# Patient Record
Sex: FEMALE | Race: WHITE
[De-identification: names, ages, dates, MRNs, and addresses within clinical notes are randomized per-mention and may not be internally consistent; named-entity substitution may affect disease eponyms.]

---

## 2017-03-14 ENCOUNTER — HOSPITAL ENCOUNTER (OUTPATIENT)
Dept: HOSPITAL 53 - M RAD | Age: 82
End: 2017-03-14
Attending: INTERNAL MEDICINE
Payer: MEDICARE

## 2017-03-14 DIAGNOSIS — R91.1: Primary | ICD-10-CM

## 2017-03-14 NOTE — REP
CT study of the chest without contrast:

 

History:  Solitary pulmonary nodule.

 

Comparison chest CT studies are from November 23, 2016, September 16, 2016, and

December 30, 2008.

 

CT findings:  Hyperinflation with emphysematous changes are again seen

bilaterally and fairly extensively.  There are surgical clips posterior to the

right infrahilar region.  The patient is apparently status post right lower

lobectomy.  There is a small residual triangular density in the posterior aspect

of the right base essentially unchanged from most recent prior study of November 23, 2016.  This is much improved from the large infiltrate in this region seen

originally on September 16, 2016.  This triangular opacity measures 1.4 cm in

greatest diameter.  The somewhat spiculated soft tissue nodule previously

identified in the right upper lobe is again seen.  This measures 12.8 mm in

greatest transverse dimension today, previously 11.8 and 12 mm measurements were

reported.  Coronal multiplanar reformation images demonstrate that this lesion

has a long axis more vertically oriented measuring 14 mm.  This measurement has

not changed since November 23, 2016.  There is, however, a small new nodule

superiorly adjacent to this which was not previously observed.  This measures 5

mm in greatest diameter.  This may be a small satellite nodule.

 

There are a few scattered normal-sized mediastinal lymph nodes noted.  These are

actually smaller than the hypertrophied lymph nodes seen in September 16, 2016.

No progressive kellie change is seen.  No pleural effusion is noted.  There is a

complex septated appearing cyst in the left lobe of the liver which is unchanged.

This measures 5.7 cm.  Colonic diverticulosis is noted.  No adrenal lesion is

observed.  There are left-sided thyroid nodules again seen.  These are unchanged

from September 16 2016.

 

Impression:

 

No definite change in the right upper lobe pulmonary nodule.  This has a 12.8 mm

transverse dimension by 14 mm craniocaudal.  There is, however, a small new

nodule less than a centimeter from this which may be a small satellite nodule.

This measures 5 mm in diameter.  Malignancy must be suspected.  Stable

postoperative changes and evidence of COPD.

 

 

Signed by

Jake Turner MD 03/14/2017 03:17 P

## 2017-03-16 ENCOUNTER — HOSPITAL ENCOUNTER (EMERGENCY)
Dept: HOSPITAL 53 - M ED | Age: 82
Discharge: HOME | End: 2017-03-16
Payer: MEDICARE

## 2017-03-16 VITALS — DIASTOLIC BLOOD PRESSURE: 70 MMHG | SYSTOLIC BLOOD PRESSURE: 144 MMHG

## 2017-03-16 VITALS — HEIGHT: 61 IN | BODY MASS INDEX: 24.55 KG/M2 | WEIGHT: 130 LBS

## 2017-03-16 DIAGNOSIS — F03.90: ICD-10-CM

## 2017-03-16 DIAGNOSIS — S81.812A: Primary | ICD-10-CM
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
## 2017-03-16 DIAGNOSIS — E02: ICD-10-CM
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
## 2017-03-16 DIAGNOSIS — Z79.899: ICD-10-CM

## 2017-03-16 LAB
ALBUMIN SERPL BCG-MCNC: 3 GM/DL (ref 3.2–5.2)
ALBUMIN/GLOB SERPL: 0.75 {RATIO} (ref 1–1.93)
ALP SERPL-CCNC: 83 U/L (ref 45–117)
ALT SERPL W P-5'-P-CCNC: 39 U/L (ref 12–78)
ANION GAP SERPL CALC-SCNC: 6 MEQ/L (ref 8–16)
AST SERPL-CCNC: 32 U/L (ref 15–37)
BASOPHILS # BLD AUTO: 0 K/MM3 (ref 0–0.2)
BASOPHILS NFR BLD AUTO: 0.3 % (ref 0–1)
BILIRUB CONJ SERPL-MCNC: < 0.1 MG/DL (ref 0–0.2)
BILIRUB SERPL-MCNC: 0.2 MG/DL (ref 0.2–1)
BUN SERPL-MCNC: 31 MG/DL (ref 7–18)
CALCIUM SERPL-MCNC: 8.8 MG/DL (ref 8.8–10.2)
CAOX CRY URNS QL MICRO: (no result)
CHLORIDE SERPL-SCNC: 107 MEQ/L (ref 98–107)
CO2 SERPL-SCNC: 29 MEQ/L (ref 21–32)
CREAT SERPL-MCNC: 1.05 MG/DL (ref 0.55–1.02)
EOSINOPHIL # BLD AUTO: 0 K/MM3 (ref 0–0.5)
EOSINOPHIL NFR BLD AUTO: 0.7 % (ref 0–3)
ERYTHROCYTE [DISTWIDTH] IN BLOOD BY AUTOMATED COUNT: 15 % (ref 11.5–14.5)
GFR SERPL CREATININE-BSD FRML MDRD: 52.7 ML/MIN/{1.73_M2} (ref 32–?)
GLUCOSE SERPL-MCNC: 77 MG/DL (ref 83–110)
LARGE UNSTAINED CELL #: 0.1 K/MM3 (ref 0–0.4)
LARGE UNSTAINED CELL %: 2.3 % (ref 0–4)
LYMPHOCYTES # BLD AUTO: 1.7 K/MM3 (ref 1.5–4.5)
LYMPHOCYTES NFR BLD AUTO: 25.7 % (ref 24–44)
MCH RBC QN AUTO: 27 PG (ref 27–33)
MCHC RBC AUTO-ENTMCNC: 30.5 G/DL (ref 32–36.5)
MCV RBC AUTO: 88.5 FL (ref 80–96)
MONOCYTES # BLD AUTO: 0.3 K/MM3 (ref 0–0.8)
MONOCYTES NFR BLD AUTO: 5.1 % (ref 0–5)
NEUTROPHILS # BLD AUTO: 4.1 K/MM3 (ref 1.8–7.7)
NEUTROPHILS NFR BLD AUTO: 66 % (ref 36–66)
PLATELET # BLD AUTO: 169 K/MM3 (ref 150–450)
POTASSIUM SERPL-SCNC: 4.2 MEQ/L (ref 3.5–5.1)
PROT SERPL-MCNC: 7 GM/DL (ref 6.4–8.2)
SODIUM SERPL-SCNC: 142 MEQ/L (ref 136–145)
T4 FREE SERPL-MCNC: 1.34 NG/DL (ref 0.76–1.46)
WBC # BLD AUTO: 6.2 K/MM3 (ref 4–10)

## 2017-03-16 NOTE — REP
CT BRAIN WITHOUT CONTRAST:  03/16/2017.

 

CLINICAL HISTORY:  88-year-old female with altered mental status.

 

No prior studies.

 

FINDINGS:  Soft-tissue and bone windows show ventricles midline, symmetric and

dilated.  Third and fourth ventricles are mildly prominent. Ventricular

dilatation is proportionate to the moderately severe diffuse atrophy and that

atrophy is greatest in the frontal and temporal lobes.  Basal ganglia are

symmetric.  There are diffuse heterogeneous low attenuation white matter changes

bilaterally in the periventricular deep and subcortical white matter tracts in a

symmetric fashion consistent with small vessel chronic ischemic change.

Brainstem is unremarkable.  The cerebellum shows atrophy but no hemorrhage or

mass.  Cortical stripe shows the atrophy without vascular territory infarct,

mass, hemorrhage, or extra-axial fluid collection.

 

Basal cisterns are intact.  Mastoids show some slight opacification of air cells

posteriorly and inferiorly right lateral mastoids with the external auditory

canals, IACs and middle ears grossly  intact.  The visualized sinuses, skull base

and calvarium are without fracture or focal lesion or destructive finding.

 

IMPRESSION:

 

1. Ventriculomegaly with proportionate cerebral atrophy, all age appropriate.

There is fairly extensive chronic small vessel white matter ischemic change in

both hemispheres.

 

2.  Cerebellar atrophy without focal lesion, hemorrhage or mass in the brainstem

or cerebellum.

 

3.  Basal cisterns intact.

 

4.  Posterior inferolateral right mastoid air cells with some opacification but

no destructive lesion.

 

5.  Visualized sinuses, skull base and calvarium intact.

 

 

Signed by

Jeison Perez MD 03/16/2017 04:17 P

## 2017-03-17 NOTE — ECGEPIP
Stationary ECG Study

                           [facility] - ED

                                       

                                       Test Date:    2017

Pat Name:     FAWN FERGUSON            Department:   

Patient ID:   [id number]                 Room:         -

Gender:       F                        Technician:   MARY

:          [id number]               Requested By: AMOR MARIA PA-C.

Order Number: [id number]     Reading MD:   Marisela Canela

                                 Measurements

Intervals                              Axis          

Rate:         52                       P:            72

NE:           174                      QRS:          66

QRSD:         96                       T:            49

QT:           431                                    

QTc:          402                                    

                           Interpretive Statements

SINUS BRADYCARDIA

NSTTW ABNORMALITY

DECREASED RATE 16

Electronically Signed On 3- 8:43:49 EDT by Marisela Canela

## 2017-05-23 ENCOUNTER — HOSPITAL ENCOUNTER (OUTPATIENT)
Dept: HOSPITAL 53 - M LABDRAW1 | Age: 82
End: 2017-05-23
Attending: INTERNAL MEDICINE
Payer: MEDICARE

## 2017-05-23 DIAGNOSIS — E78.00: ICD-10-CM

## 2017-05-23 DIAGNOSIS — J45.909: Primary | ICD-10-CM

## 2017-05-23 DIAGNOSIS — I10: ICD-10-CM

## 2017-05-23 DIAGNOSIS — E55.9: ICD-10-CM

## 2017-05-23 LAB
ALBUMIN SERPL BCG-MCNC: 3.4 GM/DL (ref 3.2–5.2)
ALBUMIN/GLOB SERPL: 1 {RATIO} (ref 1–1.93)
ALP SERPL-CCNC: 66 U/L (ref 45–117)
ALT SERPL W P-5'-P-CCNC: 19 U/L (ref 12–78)
ANION GAP SERPL CALC-SCNC: 7 MEQ/L (ref 8–16)
AST SERPL-CCNC: 16 U/L (ref 15–37)
BILIRUB SERPL-MCNC: 0.3 MG/DL (ref 0.2–1)
BUN SERPL-MCNC: 30 MG/DL (ref 7–18)
CALCIUM SERPL-MCNC: 8.8 MG/DL (ref 8.8–10.2)
CHLORIDE SERPL-SCNC: 102 MEQ/L (ref 98–107)
CHOLEST SERPL-MCNC: 204 MG/DL (ref ?–200)
CO2 SERPL-SCNC: 31 MEQ/L (ref 21–32)
CREAT SERPL-MCNC: 1.12 MG/DL (ref 0.55–1.02)
ERYTHROCYTE [DISTWIDTH] IN BLOOD BY AUTOMATED COUNT: 15.3 % (ref 11.5–14.5)
GFR SERPL CREATININE-BSD FRML MDRD: 48.8 ML/MIN/{1.73_M2} (ref 32–?)
GLUCOSE SERPL-MCNC: 100 MG/DL (ref 83–110)
MCH RBC QN AUTO: 29.6 PG (ref 27–33)
MCHC RBC AUTO-ENTMCNC: 32.2 G/DL (ref 32–36.5)
MCV RBC AUTO: 92 FL (ref 80–96)
PLATELET # BLD AUTO: 210 K/MM3 (ref 150–450)
POTASSIUM SERPL-SCNC: 3.9 MEQ/L (ref 3.5–5.1)
PROT SERPL-MCNC: 6.8 GM/DL (ref 6.4–8.2)
SODIUM SERPL-SCNC: 140 MEQ/L (ref 136–145)
TRIGL SERPL-MCNC: 84 MG/DL (ref ?–150)
WBC # BLD AUTO: 6.9 K/MM3 (ref 4–10)

## 2017-06-05 ENCOUNTER — HOSPITAL ENCOUNTER (OUTPATIENT)
Dept: HOSPITAL 53 - M SFHCPLAZ | Age: 82
End: 2017-06-05
Attending: INTERNAL MEDICINE
Payer: MEDICARE

## 2017-06-05 DIAGNOSIS — E55.9: ICD-10-CM

## 2017-06-05 DIAGNOSIS — E78.00: ICD-10-CM

## 2017-06-05 DIAGNOSIS — Z53.8: ICD-10-CM

## 2017-06-05 DIAGNOSIS — I10: ICD-10-CM

## 2017-06-05 DIAGNOSIS — J45.909: Primary | ICD-10-CM

## 2017-12-12 ENCOUNTER — HOSPITAL ENCOUNTER (OUTPATIENT)
Dept: HOSPITAL 53 - M SFHCPLAZ | Age: 82
End: 2017-12-12
Attending: INTERNAL MEDICINE
Payer: MEDICARE

## 2017-12-12 DIAGNOSIS — I10: Primary | ICD-10-CM

## 2017-12-12 LAB
ALBUMIN SERPL BCG-MCNC: 3.6 GM/DL (ref 3.2–5.2)
ALBUMIN/GLOB SERPL: 0.95 {RATIO} (ref 1–1.93)
ALP SERPL-CCNC: 85 U/L (ref 45–117)
ALT SERPL W P-5'-P-CCNC: 25 U/L (ref 12–78)
ANION GAP SERPL CALC-SCNC: 6 MEQ/L (ref 8–16)
AST SERPL-CCNC: 21 U/L (ref 7–37)
BILIRUB SERPL-MCNC: 0.3 MG/DL (ref 0.2–1)
BUN SERPL-MCNC: 33 MG/DL (ref 7–18)
CALCIUM SERPL-MCNC: 9.3 MG/DL (ref 8.8–10.2)
CHLORIDE SERPL-SCNC: 100 MEQ/L (ref 98–107)
CO2 SERPL-SCNC: 35 MEQ/L (ref 21–32)
CREAT SERPL-MCNC: 1.08 MG/DL (ref 0.55–1.02)
GFR SERPL CREATININE-BSD FRML MDRD: 50.9 ML/MIN/{1.73_M2} (ref 32–?)
GLUCOSE SERPL-MCNC: 68 MG/DL (ref 83–110)
MAGNESIUM SERPL-MCNC: 2.5 MG/DL (ref 1.8–2.4)
POTASSIUM SERPL-SCNC: 4.4 MEQ/L (ref 3.5–5.1)
PROT SERPL-MCNC: 7.4 GM/DL (ref 6.4–8.2)
SODIUM SERPL-SCNC: 141 MEQ/L (ref 136–145)

## 2018-02-06 ENCOUNTER — HOSPITAL ENCOUNTER (EMERGENCY)
Dept: HOSPITAL 53 - M ED | Age: 83
Discharge: LEFT BEFORE DISCHARGE | End: 2018-02-06
Payer: MEDICARE

## 2018-02-06 DIAGNOSIS — Z53.29: Primary | ICD-10-CM
